# Patient Record
Sex: FEMALE | Race: ASIAN
[De-identification: names, ages, dates, MRNs, and addresses within clinical notes are randomized per-mention and may not be internally consistent; named-entity substitution may affect disease eponyms.]

---

## 2019-08-16 ENCOUNTER — HOSPITAL ENCOUNTER (OUTPATIENT)
Dept: HOSPITAL 88 - RAD | Age: 24
End: 2019-08-16
Attending: INTERNAL MEDICINE
Payer: COMMERCIAL

## 2019-08-16 DIAGNOSIS — M54.5: ICD-10-CM

## 2019-08-16 DIAGNOSIS — M54.6: ICD-10-CM

## 2019-08-16 DIAGNOSIS — Z11.1: Primary | ICD-10-CM

## 2019-08-16 PROCEDURE — 71046 X-RAY EXAM CHEST 2 VIEWS: CPT

## 2019-08-16 PROCEDURE — 72220 X-RAY EXAM SACRUM TAILBONE: CPT

## 2019-08-16 PROCEDURE — 72110 X-RAY EXAM L-2 SPINE 4/>VWS: CPT

## 2019-08-16 PROCEDURE — 81025 URINE PREGNANCY TEST: CPT

## 2019-08-16 PROCEDURE — 72072 X-RAY EXAM THORAC SPINE 3VWS: CPT

## 2019-08-16 NOTE — DIAGNOSTIC IMAGING REPORT
EXAMINATION:  CHEST 2 VIEWS    



INDICATION:      Pain.  

^[id number]

^[id number] 



COMPARISON:  None

     

FINDINGS:

TUBES and LINES:  None.



LUNGS:  Lungs are well inflated.  There are bibasilar atelectasis.   There is

no evidence of pneumonia or pulmonary edema.



PLEURA:  No pleural effusion or pneumothorax.



HEART AND MEDIASTINUM:  The cardiomediastinal silhouette is unremarkable.    



BONES AND SOFT TISSUES:  No acute osseous lesion.  Soft tissues are

unremarkable.



UPPER ABDOMEN: No free air under the diaphragm.    



IMPRESSION: 

No acute thoracic abnormality.





Signed by: Dr. SHAVON Huang M.D. on 8/16/2019 7:33 PM

## 2019-08-16 NOTE — DIAGNOSTIC IMAGING REPORT
AP and Lateral views of the thoracic and lumbar spine - 3 views thoracic, 5

views lumbar obliques

Sacrum - 2 views



HISTORY: Back pain

COMPARISON:  None.



FINDINGS:

Some of the osseous structures are partially obscured by stool and overlying

bowel gas. 

Minimal shaped curvature.

Right hypoplastic ribs at T12.



Thoracic spine:

No evidence of a compression deformity or displaced fracture. 

The disc spaces are well-maintained.



Lumbar spine:

There are 5 non-rib bearing lumbar-type vertebral bodies.  

No evidence of a compression deformity or displaced fracture. 

Minimal degenerative changes at T12-L1.

The facet joints are unremarkable.



Other:

Mild degenerative changes of the right sacroiliac joint.





IMPRESSION:

1.  No acute radiographic abnormality.

2.  Minimal to mild degenerative changes.



Signed by: Dr. Seth Iverson D.O., M.M.M. on 8/16/2019 9:46 PM

## 2020-02-12 ENCOUNTER — HOSPITAL ENCOUNTER (OUTPATIENT)
Dept: HOSPITAL 88 - CT | Age: 25
End: 2020-02-12
Attending: INTERNAL MEDICINE
Payer: COMMERCIAL

## 2020-02-12 DIAGNOSIS — G44.329: Primary | ICD-10-CM

## 2020-02-12 PROCEDURE — 70450 CT HEAD/BRAIN W/O DYE: CPT

## 2020-02-12 NOTE — DIAGNOSTIC IMAGING REPORT
EXAMINATION: Head CT 



HISTORY: Chronic posttraumatic headaches, head trauma a year ago

COMPARISON: None.

TECHNIQUE: Multidetector axial images were obtained  with, without  contrast

from the foramen magnum to the vertex . The images were reconstructed using

brain and bone algorithms.  Thin section brain images were reformatted into

coronal and sagittal planes.

Image quality: Motion/streaking artifact limits the evaluation of the skull

base and posterior cranial fossa.

Dose modulation, iterative reconstruction, and/or weight based adjustment of

the mA/kV was utilized to reduce the radiation dose to as low as reasonably

achievable.





FINDINGS:



     Parenchyma: 

1.  No abnormal densities.

2.  No mass or hemorrhage. No CT evidence of acute territorial vascular insult.



    

     Extra-axial spaces:No abnormal density. No extra-axial fluid collections 

     Brain volume: Normal for age. 

     Ventricles: No hydrocephalus or displacement.  

     Arteries: No density suggestive of thrombus. 

     Dural sinuses: No abnormal density. 

     Foramen magnum: No mass, Chiari malformation, or basilar invagination. 

     Sella: No obvious mass.  

     Paranasal/mastoid sinuses: Imaged portions unremarkable. 

     Skull/Scalp: No lytic or blastic lesions.  No fractures. 



IMPRESSION:



Normal head CT.



Signed by: Dr. Olivia Jones M.D. on 2/12/2020 3:36 PM
about 5 years ago